# Patient Record
Sex: FEMALE | Race: WHITE | NOT HISPANIC OR LATINO | Employment: STUDENT | URBAN - METROPOLITAN AREA
[De-identification: names, ages, dates, MRNs, and addresses within clinical notes are randomized per-mention and may not be internally consistent; named-entity substitution may affect disease eponyms.]

---

## 2019-12-28 ENCOUNTER — OFFICE VISIT (OUTPATIENT)
Dept: URGENT CARE | Facility: CLINIC | Age: 29
End: 2019-12-28
Payer: COMMERCIAL

## 2019-12-28 VITALS
WEIGHT: 125 LBS | HEIGHT: 64 IN | DIASTOLIC BLOOD PRESSURE: 54 MMHG | RESPIRATION RATE: 16 BRPM | TEMPERATURE: 97.3 F | BODY MASS INDEX: 21.34 KG/M2 | OXYGEN SATURATION: 100 % | SYSTOLIC BLOOD PRESSURE: 114 MMHG | HEART RATE: 68 BPM

## 2019-12-28 DIAGNOSIS — J01.90 ACUTE SINUSITIS, RECURRENCE NOT SPECIFIED, UNSPECIFIED LOCATION: Primary | ICD-10-CM

## 2019-12-28 PROCEDURE — 99203 OFFICE O/P NEW LOW 30 MIN: CPT | Performed by: PHYSICIAN ASSISTANT

## 2019-12-28 RX ORDER — LEVOTHYROXINE SODIUM 0.05 MG/1
50 TABLET ORAL DAILY
Refills: 0 | COMMUNITY
Start: 2019-12-03

## 2019-12-28 RX ORDER — PREDNISONE 10 MG/1
TABLET ORAL
Qty: 26 TABLET | Refills: 0 | Status: SHIPPED | OUTPATIENT
Start: 2019-12-28

## 2019-12-28 RX ORDER — AMOXICILLIN 875 MG/1
875 TABLET, COATED ORAL 2 TIMES DAILY
Qty: 20 TABLET | Refills: 0 | Status: SHIPPED | OUTPATIENT
Start: 2019-12-28 | End: 2020-01-07

## 2019-12-28 NOTE — PROGRESS NOTES
[address]    NAME: Socorro Palmer is a 34 y o  female  : 1990    MRN: [id number]  DATE: 2019  TIME: 11:59 AM    Assessment and Plan   Acute sinusitis, recurrence not specified, unspecified location [J01 90]  1  Acute sinusitis, recurrence not specified, unspecified location  amoxicillin (AMOXIL) 875 mg tablet    predniSONE 10 mg tablet       Patient Instructions     Patient Instructions   I have prescribed an antibiotic for the infection  Please take the antibiotic as prescribed and finish the entire prescription  I recommend that the patient takes an over the counter probiotic or eats yogurt with live cultures in it [place]) to keep good bacteria in the gut and help prevent diarrhea  Wash hands frequently to prevent the spread of infection  Can use over the counter cough and cold medications to help with symptoms  Ibuprofen and/or tylenol as needed for pain or fever  If not improving over the next 7-10 days, follow up with PCP  Chief Complaint     Chief Complaint   Patient presents with    Cough     x several weeks       History of Present Illness   53-year-old female here with complaint of cough for the last 4-6 weeks  Has been getting progressively worse  States that is just not going away  Does take Mucinex over-the-counter which does help a little bit during the day  States that the cough happens more when she excels and she feels it in her lower throat/chest area  No wheeze or shortness of breath  Some very mild nasal congestion when she 1st gets up in the morning  Review of Systems   Review of Systems   Constitutional: Negative for appetite change, chills and fever  HENT: Positive for postnasal drip and sore throat  Negative for congestion, ear discharge, ear pain, facial swelling, sinus pressure and sneezing  Respiratory: Positive for cough  Negative for shortness of breath and wheezing  Neurological: Negative for headaches         Current Medications     Current Outpatient Medications:     levothyroxine 50 mcg tablet, Take 50 mcg by mouth daily, Disp: , Rfl: 0    amoxicillin (AMOXIL) 875 mg tablet, Take 1 tablet (875 mg total) by mouth 2 (two) times a day for 10 days, Disp: 20 tablet, Rfl: 0    predniSONE 10 mg tablet, Take 3 tabs BID X 2 days, 2 tabs BID X 2 days, 1 tab BID X 2 days, 1 tab daily X 2 days, Disp: 26 tablet, Rfl: 0    Current Allergies     Allergies as of 12/28/2019    (No Known Allergies)          The following portions of the patient's history were reviewed and updated as appropriate: allergies, current medications, past family history, past medical history, past social history, past surgical history and problem list    Past Medical History:   Diagnosis Date    Disease of thyroid gland      History reviewed  No pertinent surgical history  History reviewed  No pertinent family history    Social History     Socioeconomic History    Marital status: Single     Spouse name: Not on file    Number of children: Not on file    Years of education: Not on file    Highest education level: Not on file   Occupational History    Not on file   Social Needs    Financial resource strain: Not on file    Food insecurity:     Worry: Not on file     Inability: Not on file    Transportation needs:     Medical: Not on file     Non-medical: Not on file   Tobacco Use    Smoking status: Not on file   Substance and Sexual Activity    Alcohol use: Not on file    Drug use: Not on file    Sexual activity: Not on file   Lifestyle    Physical activity:     Days per week: Not on file     Minutes per session: Not on file    Stress: Not on file   Relationships    Social connections:     Talks on phone: Not on file     Gets together: Not on file     Attends Buddhist service: Not on file     Active member of club or organization: Not on file     Attends meetings of clubs or organizations: Not on file     Relationship status: Not on file    Intimate partner violence:     Fear of current or ex partner: Not on file     Emotionally abused: Not on file     Physically abused: Not on file     Forced sexual activity: Not on file   Other Topics Concern    Not on file   Social History Narrative    Not on file     Medications have been verified  Objective   /54   Pulse 68   Temp (!) 97 3 °F (36 3 °C)   Resp 16   Ht 5' 4" (1 626 m)   Wt 56 7 kg (125 lb)   SpO2 100%   BMI 21 46 kg/m²      Physical Exam   Physical Exam   Constitutional: She appears well-developed and well-nourished  No distress  HENT:   Head: Normocephalic and atraumatic  Right Ear: Tympanic membrane normal    Left Ear: Tympanic membrane normal    Nose: Mucosal edema present  No rhinorrhea  Right sinus exhibits no maxillary sinus tenderness and no frontal sinus tenderness  Left sinus exhibits no maxillary sinus tenderness and no frontal sinus tenderness  Mouth/Throat: Posterior oropharyngeal erythema (Postnasal drip in posterior pharynx) present  No oropharyngeal exudate or posterior oropharyngeal edema  Eyes: Conjunctivae are normal    Cardiovascular: Normal rate, regular rhythm and normal heart sounds  No murmur heard  Pulmonary/Chest: Effort normal and breath sounds normal  No respiratory distress  Nursing note and vitals reviewed

## 2023-05-19 ENCOUNTER — TRANSCRIBE ORDERS (OUTPATIENT)
Dept: LAB | Facility: CLINIC | Age: 33
End: 2023-05-19

## 2023-05-19 DIAGNOSIS — Z00.8 HEALTH EXAMINATION IN POPULATION SURVEY: Primary | ICD-10-CM

## 2023-06-01 PROBLEM — E03.9 HYPOTHYROID: Status: ACTIVE | Noted: 2019-03-01

## 2023-06-02 ENCOUNTER — OFFICE VISIT (OUTPATIENT)
Dept: OBGYN CLINIC | Facility: CLINIC | Age: 33
End: 2023-06-02

## 2023-06-02 VITALS
HEIGHT: 64 IN | WEIGHT: 126.4 LBS | SYSTOLIC BLOOD PRESSURE: 98 MMHG | BODY MASS INDEX: 21.58 KG/M2 | DIASTOLIC BLOOD PRESSURE: 62 MMHG

## 2023-06-02 DIAGNOSIS — N94.6 DYSMENORRHEA: ICD-10-CM

## 2023-06-02 DIAGNOSIS — Z01.419 ENCOUNTER FOR ANNUAL ROUTINE GYNECOLOGICAL EXAMINATION: Primary | ICD-10-CM

## 2023-06-02 PROCEDURE — G0145 SCR C/V CYTO,THINLAYER,RESCR: HCPCS | Performed by: ADVANCED PRACTICE MIDWIFE

## 2023-06-02 RX ORDER — IBUPROFEN 600 MG/1
600 TABLET ORAL EVERY 6 HOURS PRN
Qty: 30 TABLET | Refills: 2 | Status: SHIPPED | OUTPATIENT
Start: 2023-06-02

## 2023-06-02 RX ORDER — LEVOTHYROXINE SODIUM 88 UG/1
TABLET ORAL
COMMUNITY
Start: 2023-05-27

## 2023-06-02 NOTE — PROGRESS NOTES
[facility] [place], [place]    ASSESSMENT/PLAN: Humaira Shah is a 34 y o  No obstetric history on file  who presents for annual gynecologic exam     Encounter for routine gynecologic examination  - Routine well woman exam completed today  - Cervical Cancer Screening: Current ASCCP Guidelines reviewed  Last Pap: 2017  Next Pap Due: today  - HPV Vaccination status: Immunization series complete  - STI screening offered including HIV testing: not indicated based on hx or requested at time of visit  - Contraceptive counseling discussed  Current contraception: condoms   - RTO 1 yr     Additional problems addressed during this visit:  1  Encounter for annual routine gynecological examination  -     Liquid-based pap, screening    2  Dysmenorrhea  -     ibuprofen (MOTRIN) 600 mg tablet; Take 1 tablet (600 mg total) by mouth every 6 (six) hours as needed for mild pain or moderate pain        CC:  Annual Gynecologic Examination    HPI: Humaira Shah is a 34 y o  No obstetric history on file  who presents for annual gynecologic examination  Yudy Paul presents for gyn exam today  5/27/2023 LMP  Menses is regular- within a 2 week time span since stopping OCP, flow heavy first day and minimal for 4 days  States that she is dealing well with cycle  Using abstinence as birth control method  Happy with method  Last pap smear 2017- ASCUS, HPV negative  History of abnormal pap smear- no  Sexually active- no  HPV vaccine - yes  Does not desire STI testing  8-9 hrs sleep per day  Trialing servings of calcium rich food per day  Walks 7 days per week  Minimal servings of caffeine per day  Occasionally perform SBE monthly  Safe at home - yes  Wears- seat belts  Concerns : notes itching near hymenal opening  Minimal carbs, does complex carbs           The following portions of the patient's history were reviewed and updated as appropriate: allergies, current medications, past family history, "past medical history, obstetric history, gynecologic history, past social history, past surgical history and problem list     Review of Systems   Constitutional: Positive for fatigue  Negative for chills and fever  Respiratory: Negative for cough and shortness of breath  Cardiovascular: Negative for chest pain, palpitations and leg swelling  Gastrointestinal: Positive for constipation  Negative for diarrhea  Genitourinary: Positive for menstrual problem  Negative for difficulty urinating, dysuria, frequency, pelvic pain, urgency, vaginal bleeding, vaginal discharge and vaginal pain  Neurological: Positive for headaches  Negative for light-headedness  Psychiatric/Behavioral: The patient is not nervous/anxious  Objective:  BP 98/62   Ht 5' 4\" (1 626 m)   Wt 57 3 kg (126 lb 6 4 oz)   LMP 05/27/2023 (Exact Date)   BMI 21 70 kg/m²    Physical Exam  Vitals reviewed  Constitutional:       Appearance: Normal appearance  HENT:      Head: Normocephalic  Neck:      Thyroid: No thyroid mass or thyroid tenderness  Cardiovascular:      Rate and Rhythm: Normal rate and regular rhythm  Heart sounds: Normal heart sounds  Pulmonary:      Effort: Pulmonary effort is normal       Breath sounds: Normal breath sounds  Chest:   Breasts:     Right: No mass, nipple discharge, skin change or tenderness  Left: No mass, nipple discharge, skin change or tenderness  Abdominal:      General: There is no distension  Palpations: There is no mass  Tenderness: There is no abdominal tenderness  There is no guarding  Genitourinary:     General: Normal vulva  Exam position: Lithotomy position  Labia:         Right: No tenderness or lesion  Left: No tenderness or lesion  Vagina: No vaginal discharge, tenderness, bleeding or lesions  Cervix: No discharge, lesion, erythema or cervical bleeding  Uterus: Normal  Not enlarged and not tender         Adnexa:         " Right: No mass, tenderness or fullness  Left: No mass, tenderness or fullness  Musculoskeletal:      Cervical back: Normal range of motion  Lymphadenopathy:      Upper Body:      Right upper body: No axillary adenopathy  Left upper body: No axillary adenopathy  Skin:     General: Skin is warm and dry  Neurological:      Mental Status: She is alert     Psychiatric:         Mood and Affect: Mood normal          Behavior: Behavior normal          Judgment: Judgment normal

## 2023-06-06 LAB
HPV HR 12 DNA CVX QL NAA+PROBE: NEGATIVE
HPV16 DNA CVX QL NAA+PROBE: NEGATIVE
HPV18 DNA CVX QL NAA+PROBE: NEGATIVE

## 2023-06-10 LAB
LAB AP GYN PRIMARY INTERPRETATION: NORMAL
Lab: NORMAL

## 2025-06-02 ENCOUNTER — TELEPHONE (OUTPATIENT)
Age: 35
End: 2025-06-02

## 2025-06-02 NOTE — TELEPHONE ENCOUNTER
Established patient called to schedule first OB visit. She has been in fertility treatment prior this and through IVF she is 6wks and 1day pregnant today. I attempted warm transfer to office. Please call patient to schedule visit.

## 2025-06-03 NOTE — TELEPHONE ENCOUNTER
Called patient to get more information and patients insurance is not in network with , she apologized, very nice patient.